# Patient Record
Sex: MALE | Race: WHITE | NOT HISPANIC OR LATINO | Employment: STUDENT | URBAN - METROPOLITAN AREA
[De-identification: names, ages, dates, MRNs, and addresses within clinical notes are randomized per-mention and may not be internally consistent; named-entity substitution may affect disease eponyms.]

---
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
## 2024-08-30 ENCOUNTER — OFFICE VISIT (OUTPATIENT)
Dept: FAMILY MEDICINE | Facility: CLINIC | Age: 19
End: 2024-08-30
Payer: COMMERCIAL

## 2024-08-30 VITALS
HEIGHT: 73 IN | BODY MASS INDEX: 31.7 KG/M2 | RESPIRATION RATE: 22 BRPM | TEMPERATURE: 97.7 F | OXYGEN SATURATION: 98 % | WEIGHT: 239.2 LBS | DIASTOLIC BLOOD PRESSURE: 87 MMHG | SYSTOLIC BLOOD PRESSURE: 136 MMHG | HEART RATE: 97 BPM

## 2024-08-30 DIAGNOSIS — Z00.00 HEALTH MAINTENANCE EXAMINATION: Primary | ICD-10-CM

## 2024-08-30 DIAGNOSIS — Z11.4 SCREENING FOR HIV (HUMAN IMMUNODEFICIENCY VIRUS): ICD-10-CM

## 2024-08-30 DIAGNOSIS — Z11.59 NEED FOR HEPATITIS C SCREENING TEST: ICD-10-CM

## 2024-08-30 LAB
ALT SERPL W P-5'-P-CCNC: 40 U/L (ref 0–50)
ANION GAP SERPL CALCULATED.3IONS-SCNC: 10 MMOL/L (ref 7–15)
BUN SERPL-MCNC: 10.2 MG/DL (ref 6–20)
CALCIUM SERPL-MCNC: 9.5 MG/DL (ref 8.8–10.4)
CHLORIDE SERPL-SCNC: 102 MMOL/L (ref 98–107)
CREAT SERPL-MCNC: 0.88 MG/DL (ref 0.67–1.17)
EGFRCR SERPLBLD CKD-EPI 2021: >90 ML/MIN/1.73M2
GLUCOSE SERPL-MCNC: 103 MG/DL (ref 70–99)
HBA1C MFR BLD: 5.2 % (ref 0–5.6)
HCO3 SERPL-SCNC: 26 MMOL/L (ref 22–29)
HCV AB SERPL QL IA: NONREACTIVE
HGB BLD-MCNC: 14.3 G/DL (ref 13.3–17.7)
HIV 1+2 AB+HIV1 P24 AG SERPL QL IA: NONREACTIVE
LDLC SERPL DIRECT ASSAY-MCNC: 107 MG/DL
POTASSIUM SERPL-SCNC: 3.6 MMOL/L (ref 3.4–5.3)
SODIUM SERPL-SCNC: 138 MMOL/L (ref 135–145)

## 2024-08-30 PROCEDURE — 84460 ALANINE AMINO (ALT) (SGPT): CPT | Performed by: FAMILY MEDICINE

## 2024-08-30 PROCEDURE — 85018 HEMOGLOBIN: CPT | Performed by: FAMILY MEDICINE

## 2024-08-30 PROCEDURE — 83721 ASSAY OF BLOOD LIPOPROTEIN: CPT | Performed by: FAMILY MEDICINE

## 2024-08-30 PROCEDURE — 83036 HEMOGLOBIN GLYCOSYLATED A1C: CPT | Performed by: FAMILY MEDICINE

## 2024-08-30 PROCEDURE — 86803 HEPATITIS C AB TEST: CPT | Performed by: FAMILY MEDICINE

## 2024-08-30 PROCEDURE — 80048 BASIC METABOLIC PNL TOTAL CA: CPT | Performed by: FAMILY MEDICINE

## 2024-08-30 PROCEDURE — 87389 HIV-1 AG W/HIV-1&-2 AB AG IA: CPT | Performed by: FAMILY MEDICINE

## 2024-08-30 PROCEDURE — 36415 COLL VENOUS BLD VENIPUNCTURE: CPT | Performed by: FAMILY MEDICINE

## 2024-08-30 PROCEDURE — 99385 PREV VISIT NEW AGE 18-39: CPT | Performed by: FAMILY MEDICINE

## 2024-08-30 SDOH — HEALTH STABILITY: PHYSICAL HEALTH: ON AVERAGE, HOW MANY MINUTES DO YOU ENGAGE IN EXERCISE AT THIS LEVEL?: 30 MIN

## 2024-08-30 SDOH — HEALTH STABILITY: PHYSICAL HEALTH: ON AVERAGE, HOW MANY DAYS PER WEEK DO YOU ENGAGE IN MODERATE TO STRENUOUS EXERCISE (LIKE A BRISK WALK)?: 3 DAYS

## 2024-08-30 ASSESSMENT — PAIN SCALES - GENERAL: PAINLEVEL: NO PAIN (0)

## 2024-08-30 ASSESSMENT — SOCIAL DETERMINANTS OF HEALTH (SDOH): HOW OFTEN DO YOU GET TOGETHER WITH FRIENDS OR RELATIVES?: MORE THAN THREE TIMES A WEEK

## 2024-08-30 NOTE — PROGRESS NOTES
"Preventive Care Visit  [facility] BASHIR Baker MD, Family Medicine  Aug 30, 2024      Assessment & Plan     Screening for HIV (human immunodeficiency virus)    - HIV Antigen Antibody Combo; Future    Need for hepatitis C screening test    - Hepatitis C Screen Reflex to HCV RNA Quant and Genotype; Future    Health maintenance examination    - HIV Antigen Antibody Combo; Future  - Hepatitis C Screen Reflex to HCV RNA Quant and Genotype; Future  - Basic metabolic panel  (Ca, Cl, CO2, Creat, Gluc, K, Na, BUN); Future  - ALT; Future  - Hemoglobin A1c; Future  - LDL cholesterol direct; Future  - Hemoglobin; Future    Patient has been advised of split billing requirements and indicates understanding: Yes        BMI  Estimated body mass index is 31.87 kg/m  as calculated from the following:    Height as of this encounter: 1.845 m (6' 0.64\").    Weight as of this encounter: 108.5 kg (239 lb 3.2 oz).   Weight management plan: Discussed healthy diet and exercise guidelines    Counseling  Appropriate preventive services were addressed with this patient via screening, questionnaire, or discussion as appropriate for fall prevention, nutrition, physical activity, Tobacco-use cessation, social engagement, weight loss and cognition.  Checklist reviewing preventive services available has been given to the patient.  Reviewed patient's diet, addressing concerns and/or questions.   He is at risk for lack of exercise and has been provided with information to increase physical activity for the benefit of his well-being.   He is at risk for psychosocial distress and has been provided with information to reduce risk.       FUTURE APPOINTMENTS:       - Follow-up visit in 1 year     Bernard Hu is a 18 year old, presenting for the following:  Physical (Fasting)        8/30/2024     8:20 AM   Additional Questions   Roomed by Darryl FLORES        Health Care Directive  Patient does not have a Health Care Directive " or Living Will: Discussed advance care planning with patient; however, patient declined at this time.    HPI        8/30/2024   General Health   How would you rate your overall physical health? Good   Feel stress (tense, anxious, or unable to sleep) Only a little      (!) STRESS CONCERN      8/30/2024   Nutrition   Three or more servings of calcium each day? Yes   Diet: Regular (no restrictions)   How many servings of fruit and vegetables per day? (!) 0-1   How many sweetened beverages each day? 0-1            8/30/2024   Exercise   Days per week of moderate/strenous exercise 3 days   Average minutes spent exercising at this level 30 min            8/30/2024   Social Factors   Frequency of gathering with friends or relatives More than three times a week   Worry food won't last until get money to buy more No   Food not last or not have enough money for food? No   Do you have housing? (Housing is defined as stable permanent housing and does not include staying ouside in a car, in a tent, in an abandoned building, in an overnight shelter, or couch-surfing.) Yes   Are you worried about losing your housing? No   Lack of transportation? No   Unable to get utilities (heat,electricity)? No            8/30/2024   Dental   Dentist two times every year? Yes            8/30/2024   TB Screening   Were you born outside of the [place]? No            Today's PHQ-2 Score:       8/30/2024     8:06 AM   PHQ-2 ( 1999 Pfizer)   Q1: Little interest or pleasure in doing things 0   Q2: Feeling down, depressed or hopeless 0   PHQ-2 Score 0   Q1: Little interest or pleasure in doing things Not at all   Q2: Feeling down, depressed or hopeless Not at all   PHQ-2 Score 0           8/30/2024   Substance Use   Alcohol more than 3/day or more than 7/wk No   Do you use any other substances recreationally? (!) CANNABIS PRODUCTS        Social History     Tobacco Use    Smoking status: Never    Smokeless tobacco: Never   Vaping Use    Vaping status: Some  Days             8/30/2024   One time HIV Screening   Previous HIV test? I don't know          8/30/2024   STI Screening   New sexual partner(s) since last STI/HIV test? (!) YES             8/30/2024   Contraception/Family Planning   Questions about contraception or family planning No           Reviewed and updated as needed this visit by Provider                    No past medical history on file.  No past surgical history on file.  Labs reviewed in EPIC  BP Readings from Last 3 Encounters:   08/30/24 136/87   02/02/23 (!) 154/110   06/02/22 137/86 (93%, Z = 1.48 /  95%, Z = 1.64)*     *BP percentiles are based on the 2017 AAP Clinical Practice Guideline for boys    Wt Readings from Last 3 Encounters:   08/30/24 108.5 kg (239 lb 3.2 oz) (99%, Z= 2.21)*   02/02/23 104.3 kg (230 lb) (98%, Z= 2.13)*   06/13/22 107.2 kg (236 lb 5.3 oz) (99%, Z= 2.30)*     * Growth percentiles are based on CDC (Boys, 2-20 Years) data.                  Patient Active Problem List   Diagnosis    Intrinsic atopic dermatitis    Xerosis cutis    Pruritus     No past surgical history on file.    Social History     Tobacco Use    Smoking status: Never    Smokeless tobacco: Never   Substance Use Topics    Alcohol use: Not on file     Family History   Family history unknown: Yes         Current Outpatient Medications   Medication Sig Dispense Refill    amphetamine-dextroamphetamine (ADDERALL XR) 10 MG 24 hr capsule TAKE 1 CAPSULE BY MOUTH EVERY DAY IN THE MORNING      LATUDA 20 MG TABS tablet TAKE 1 TABLET BY MOUTH EVERY EVENING AS DIRECTED TAKE WITH FOOD      sertraline (ZOLOFT) 100 MG tablet 150 mg. Take 150mg a day.      VITAMIN D, CHOLECALCIFEROL, PO Take by mouth daily      cetirizine (ZYRTEC) 10 MG tablet Take 10 mg by mouth At Bedtime (Patient not taking: Reported on 8/30/2024)      clobetasol (TEMOVATE) 0.05 % external ointment Twice daily to rash behind knees until clear, then twice daily as needed. (Patient not taking: Reported on  8/30/2024) 60 g 4    desonide (DESOWEN) 0.05 % cream Apply topically 2 times daily (Patient not taking: Reported on 6/13/2022)      DiphenhydrAMINE HCl (BENADRYL PO)  (Patient not taking: Reported on 8/30/2024)      Dupilumab (DUPIXENT) 300 MG/2ML prefilled pen Inject 2 mLs (300 mg) Subcutaneous every 14 days (Patient not taking: Reported on 8/30/2024) 4 mL 11    Dupilumab (DUPIXENT) 300 MG/2ML syringe Inject 2 mLs (300 mg) Subcutaneous every 14 days (Patient not taking: Reported on 8/30/2024) 4 mL 5    Dupilumab (DUPIXENT) 300 MG/2ML syringe Inject 4 mLs (600 mg) Subcutaneous See Admin Instructions for 1 dose (Patient not taking: Reported on 6/13/2022) 4 mL 0    emollient (VANICREAM) cream Apply topically as needed for other (Patient not taking: Reported on 8/30/2024)      mometasone (ELOCON) 0.1 % external ointment Twice daily to more severe eczema on the arms, body. (Patient not taking: Reported on 8/30/2024) 90 g 2    omega-3 acid ethyl esters (LOVAZA) 1 G capsule Take 2 g by mouth 2 times daily (Patient not taking: Reported on 8/30/2024)      Ruxolitinib Phosphate (OPZELURA) 1.5 % CREA Externally apply 1 Application topically daily (Patient not taking: Reported on 8/30/2024) 60 g 3    tacrolimus (PROTOPIC) 0.1 % external ointment Apply topically 2 times daily Apply twice daily to face as needed. (Patient not taking: Reported on 6/13/2022) 60 g 3    traZODone (DESYREL) 50 MG tablet 50 mg At Bedtime  (Patient not taking: Reported on 8/30/2024)      triamcinolone (KENALOG) 0.1 % external ointment Apply topically 2 times daily Apply twice daily to less severe eczema of arms, legs and body. (Patient not taking: Reported on 8/30/2024) 454 g 3    White Petrolatum ointment as needed for dry skin (Patient not taking: Reported on 6/13/2022)       Allergies   Allergen Reactions    Neosporin [Neomycin-Polymyxin-Gramicidin] Hives and Swelling     Recent Labs   Lab Test 02/02/23  1613 10/13/21  2304   ALT 65*  --    CR 0.76  "0.70   GFRESTIMATED >90  --    POTASSIUM 3.9 4.2          Review of Systems  Constitutional, HEENT, cardiovascular, pulmonary, GI, , musculoskeletal, neuro, skin, endocrine and psych systems are negative, except as otherwise noted.     Objective    Exam  /87   Pulse 97   Temp 97.7  F (36.5  C) (Tympanic)   Resp 22   Ht 1.845 m (6' 0.64\")   Wt 108.5 kg (239 lb 3.2 oz)   SpO2 98%   BMI 31.87 kg/m     Estimated body mass index is 31.87 kg/m  as calculated from the following:    Height as of this encounter: 1.845 m (6' 0.64\").    Weight as of this encounter: 108.5 kg (239 lb 3.2 oz).    Physical Exam  GENERAL: alert and no distress  EYES: Eyes grossly normal to inspection, PERRL and conjunctivae and sclerae normal  HENT: ear canals and TM's normal, nose and mouth without ulcers or lesions  NECK: no adenopathy, no asymmetry, masses, or scars  RESP: lungs clear to auscultation - no rales, rhonchi or wheezes  CV: regular rate and rhythm, normal S1 S2, no S3 or S4, no murmur, click or rub, no peripheral edema  ABDOMEN: soft, nontender, no hepatosplenomegaly, no masses and bowel sounds normal  MS: no gross musculoskeletal defects noted, no edema  SKIN: no suspicious lesions or rashes  NEURO: Normal strength and tone, mentation intact and speech normal  BACK: no CVA tenderness, no paralumbar tenderness        Vision Screen  Vision Screen Details  Reason Vision Screen Not Completed: Parent/Patient declined - No concerns  Does the patient have corrective lenses (glasses/contacts)?: Yes    Hearing Screen  Hearing Screen Not Completed  Reason Hearing Screen was not completed: Other  Comments (C&TC Required):: Patient declined stating has no concens or issues        Signed Electronically by: Jin Baker MD    "

## 2025-02-13 NOTE — PATIENT INSTRUCTIONS
[facility]- Pediatric Dermatology  Dr. Chiqui Conner, Dr. Benita Frank, Dr. Naomie Martinez, Dr. Tran Avelar, Dr. Sean Bull       Pediatric Appointment Scheduling and Call Center [phone]     Non Urgent -Triage Voicemail Line; [phone]- Bev and April RN's. Messages are checked periodically throughout the day and are returned as soon as possible.      Clinic Fax number: [phone]    If you need a prescription refill, please contact your pharmacy. They will send us an electronic request. Refills are approved or denied by our Physicians during normal business hours, Monday through Fridays    Per office policy, refills will not be granted if you have not been seen within the past year (or sooner depending on your child's condition)    *Radiology Scheduling- [phone]  *Sedation Unit Scheduling- [phone]  *[place] Scheduling- General [phone]; Pediatric Dermatology [phone]  *Main  Services: [phone]   Russian: [phone]   Paraguayan: [phone]   Hmong/South Sudanese/Heri: [phone]    For urgent matters that cannot wait until the next business day, is over a holiday and/or a weekend please call [phone] and ask for the Dermatology Resident On-Call to be paged.         Plan for flares:  1. Triamcinolone 0.1% ointment twice daily to affected areas with mometasone 0.1% twice daily to severe areas (not face) x 2 weeks  2. Bleach baths daily x 1 week  3. Thick moisturizing cream twice daily after the mometasone    Plan for maintenance:  1. Twice daily thick moisturizing cream  2. Bleach baths 3 times weekly            
fair plus